# Patient Record
Sex: FEMALE | Race: BLACK OR AFRICAN AMERICAN
[De-identification: names, ages, dates, MRNs, and addresses within clinical notes are randomized per-mention and may not be internally consistent; named-entity substitution may affect disease eponyms.]

---

## 2017-12-19 ENCOUNTER — HOSPITAL ENCOUNTER (OUTPATIENT)
Dept: HOSPITAL 74 - JASUSAT | Age: 52
LOS: 1 days | Discharge: HOME | End: 2017-12-20
Attending: PLASTIC SURGERY
Payer: SELF-PAY

## 2017-12-19 VITALS — BODY MASS INDEX: 24.3 KG/M2

## 2017-12-19 DIAGNOSIS — M95.8: ICD-10-CM

## 2017-12-19 DIAGNOSIS — Z41.1: Primary | ICD-10-CM

## 2017-12-19 PROCEDURE — 0J080ZZ ALTERATION OF ABDOMEN SUBCUTANEOUS TISSUE AND FASCIA, OPEN APPROACH: ICD-10-PCS | Performed by: PLASTIC SURGERY

## 2017-12-19 RX ADMIN — CEFAZOLIN SODIUM SCH MLS/HR: 1 INJECTION, SOLUTION INTRAVENOUS at 15:00

## 2017-12-19 RX ADMIN — CEFAZOLIN SODIUM SCH MLS/HR: 1 INJECTION, SOLUTION INTRAVENOUS at 21:48

## 2017-12-19 RX ADMIN — HEPARIN SODIUM SCH UNIT: 5000 INJECTION, SOLUTION INTRAVENOUS; SUBCUTANEOUS at 21:48

## 2017-12-19 RX ADMIN — ACETAMINOPHEN PRN MG: 10 INJECTION, SOLUTION INTRAVENOUS at 14:50

## 2017-12-19 RX ADMIN — SODIUM CHLORIDE, POTASSIUM CHLORIDE, SODIUM LACTATE AND CALCIUM CHLORIDE SCH: 600; 310; 30; 20 INJECTION, SOLUTION INTRAVENOUS at 16:40

## 2017-12-19 NOTE — OP
Operative Note





- Note:


Operative Date: 12/19/17


Pre-Operative Diagnosis: abdominal deformity


Operation: abdominoplasty


Findings: 





diastasis recti


Post-Operative Diagnosis: Same as Pre-op


Surgeon: Goldberg,Neal D


Assistant: Karo Amador


Anesthesia: General


Drains & Tubes with Location: jpx 2

## 2017-12-20 VITALS — DIASTOLIC BLOOD PRESSURE: 72 MMHG | SYSTOLIC BLOOD PRESSURE: 148 MMHG | HEART RATE: 62 BPM

## 2017-12-20 VITALS — TEMPERATURE: 98.7 F

## 2017-12-20 RX ADMIN — CEFAZOLIN SODIUM SCH MLS/HR: 1 INJECTION, SOLUTION INTRAVENOUS at 02:28

## 2017-12-20 RX ADMIN — ACETAMINOPHEN PRN MG: 10 INJECTION, SOLUTION INTRAVENOUS at 05:13

## 2017-12-20 RX ADMIN — SODIUM CHLORIDE, POTASSIUM CHLORIDE, SODIUM LACTATE AND CALCIUM CHLORIDE SCH MLS/HR: 600; 310; 30; 20 INJECTION, SOLUTION INTRAVENOUS at 05:04

## 2017-12-20 RX ADMIN — CEFAZOLIN SODIUM SCH MLS/HR: 1 INJECTION, SOLUTION INTRAVENOUS at 09:40

## 2017-12-20 RX ADMIN — HEPARIN SODIUM SCH UNIT: 5000 INJECTION, SOLUTION INTRAVENOUS; SUBCUTANEOUS at 09:40

## 2017-12-20 RX ADMIN — ACETAMINOPHEN PRN MG: 10 INJECTION, SOLUTION INTRAVENOUS at 11:18

## 2017-12-20 RX ADMIN — CEFAZOLIN SODIUM SCH MLS/HR: 1 INJECTION, SOLUTION INTRAVENOUS at 14:20

## 2017-12-20 NOTE — PN
Progress Note, Physician


Chief Complaint: 





s/p abdominoplasty post op day one


History of Present Illness: 





under general anesthesia





- Current Medication List


Current Medications: 


Active Medications





Acetaminophen (Ofirmev Injection -)  1,000 mg IVPB Q6H PRN


   PRN Reason: FEVER OR PAIN


   Last Admin: 12/20/17 05:13 Dose:  1,000 mg


Fentanyl (Sublimaze Injection -)  50 mcg IVPUSH [id number] PRN


   PRN Reason: PAIN


   Last Admin: 12/19/17 14:45 Dose:  50 mcg


Heparin Sodium (Porcine) (Heparin -)  5,000 unit SQ BID NO


   Last Admin: 12/20/17 09:40 Dose:  5,000 unit


Hydromorphone HCl (Dilaudid Injection -)  1 mg IVPUSH [id number] PRN


   PRN Reason: PAIN


Lactated Ringer's (Lactated Ringers Solution)  1,000 mls @ 125 mls/hr IV ASDIR 

NO


   Last Admin: 12/19/17 20:14 Dose:  Not Given


Cefazolin Sodium (Ancef 1 Gm Premixed Ivpb -)  1 gm in 50 mls @ 100 mls/hr IVPB 

Q6H-IV NO


   Stop: 12/25/17 14:59


   Last Admin: 12/20/17 09:40 Dose:  100 mls/hr


Lactated Ringer's (Lactated Ringers Solution)  1,000 mls @ 75 mls/hr IV ASDIR 

NO


   Last Admin: 12/20/17 05:04 Dose:  75 mls/hr


Morphine Sulfate (Morphine Sulfate)  2 mg IVPUSH Q4H PRN


   PRN Reason: PAIN


   Last Admin: 12/20/17 03:42 Dose:  2 mg


Ondansetron HCl (Zofran Injection)  4 mg IVPUSH Q6H PRN


   PRN Reason: NAUSEA AND/OR VOMITING


Ondansetron HCl (Zofran Injection)  8 mg IVPB Q6H PRN


   PRN Reason: NAUSEA


Oxycodone HCl (Roxicodone -)  5 mg PO Q4H PRN


   PRN Reason: PAIN


   Last Admin: 12/20/17 01:43 Dose:  5 mg











- Objective


Vital Signs: 


 Vital Signs











Temperature  98.7 F   12/20/17 08:00


 


Pulse Rate  59 L  12/20/17 08:00


 


Respiratory Rate  18   12/20/17 08:00


 


Blood Pressure  133/66   12/20/17 08:00


 


O2 Sat by Pulse Oximetry (%)  99   12/20/17 09:00











Constitutional: Yes: Well Nourished


Cardiovascular: Yes: WNL


Respiratory: Yes: WNL


Gastrointestinal: Yes: WNL





Assessment/Plan





Pain controlled with IV analgesia, no nausea, vomiting or other adverse effects 

of anesthetic.  Dept of anesthesia will sign off care at this time.

## 2017-12-20 NOTE — PN
Progress Note (short form)





- Note


Progress Note: 





POD 1





VSS AF


All QUENTIN thin and functioning well


No collections


All tissues viable


Ambulating, Saroj PO, Using IS, will receive sq heparin


OK for discharge with instructions

## 2017-12-22 NOTE — PATH
Surgical Pathology Report



Patient Name:  DAVE TSE

Accession #:  [id number]

[facility]. Rec. #:  [id number]                                                        

   /Age/Gender:  1965 (Age: 52) / F

Account:  [id number]                                                          

             Location: AMBULATORY SURG

Taken:  2017

Received:  2017

Reported:  2017

Physicians:  Neal David Goldberg

  



Specimen(s) Received

 ABDOMINAL TISSUE 





Clinical History

Cosmetic case







Final Diagnosis

ABDOMINAL TISSUE, ABDOMINOPLASTY:

UNREMARKABLE SKIN AND ADIPOSE TISSUE. MACROSCOPIC DIAGNOSIS.





***Electronically Signed***

Maria Luisa Cordova M.D.





Gross Description

Received in formalin labeled "abdominal tissue" is a 288 g, 23.0 x 13.5 x 2.5 cm

aggregate of 2 brown, triangular, unoriented portions of skin with underlying

soft tissue. The epidermal surfaces are unremarkable. Sectioning reveals

unremarkable yellow, lobulated adipose tissue. No lesions are identified. No

sections are submitted, gross only.





[facility]

## 2017-12-31 ENCOUNTER — HOSPITAL ENCOUNTER (EMERGENCY)
Dept: HOSPITAL 74 - JER | Age: 52
LOS: 1 days | Discharge: HOME | End: 2018-01-01
Payer: COMMERCIAL

## 2017-12-31 VITALS — DIASTOLIC BLOOD PRESSURE: 64 MMHG | TEMPERATURE: 99.2 F | HEART RATE: 87 BPM | SYSTOLIC BLOOD PRESSURE: 126 MMHG

## 2017-12-31 VITALS — BODY MASS INDEX: 22.8 KG/M2

## 2017-12-31 DIAGNOSIS — Z98.890: ICD-10-CM

## 2017-12-31 DIAGNOSIS — K59.00: Primary | ICD-10-CM

## 2017-12-31 LAB
ALBUMIN SERPL-MCNC: 3.5 G/DL (ref 3.4–5)
ALP SERPL-CCNC: 72 U/L (ref 45–117)
ALT SERPL-CCNC: 21 U/L (ref 12–78)
ANION GAP SERPL CALC-SCNC: 11 MMOL/L (ref 8–16)
APPEARANCE UR: CLEAR
AST SERPL-CCNC: 11 U/L (ref 15–37)
BASOPHILS # BLD: 1.5 % (ref 0–2)
BILIRUB SERPL-MCNC: 0.9 MG/DL (ref 0.2–1)
BILIRUB UR STRIP.AUTO-MCNC: NEGATIVE MG/DL
BUN SERPL-MCNC: 9 MG/DL (ref 7–18)
CALCIUM SERPL-MCNC: 9.1 MG/DL (ref 8.5–10.1)
CHLORIDE SERPL-SCNC: 102 MMOL/L (ref 98–107)
CO2 SERPL-SCNC: 28 MMOL/L (ref 21–32)
COLOR UR: YELLOW
CREAT SERPL-MCNC: 0.7 MG/DL (ref 0.55–1.02)
DEPRECATED RDW RBC AUTO: 14.2 % (ref 11.6–15.6)
EOSINOPHIL # BLD: 0.7 % (ref 0–4.5)
GLUCOSE SERPL-MCNC: 89 MG/DL (ref 74–106)
HCT VFR BLD CALC: 38 % (ref 32.4–45.2)
HGB BLD-MCNC: 12.4 GM/DL (ref 10.7–15.3)
KETONES UR QL STRIP: NEGATIVE
LEUKOCYTE ESTERASE UR QL STRIP.AUTO: NEGATIVE
LIPASE SERPL-CCNC: 91 U/L (ref 73–393)
LYMPHOCYTES # BLD: 24.1 % (ref 8–40)
MCH RBC QN AUTO: 26.4 PG (ref 25.7–33.7)
MCHC RBC AUTO-ENTMCNC: 32.6 G/DL (ref 32–36)
MCV RBC: 80.9 FL (ref 80–96)
MONOCYTES # BLD AUTO: 6.7 % (ref 3.8–10.2)
NEUTROPHILS # BLD: 67 % (ref 42.8–82.8)
NITRITE UR QL STRIP: NEGATIVE
PH UR: 5 [PH] (ref 5–8)
PLATELET # BLD AUTO: 166 K/MM3 (ref 134–434)
PMV BLD: 9.5 FL (ref 7.5–11.1)
POTASSIUM SERPLBLD-SCNC: 3.5 MMOL/L (ref 3.5–5.1)
PROT SERPL-MCNC: 7.3 G/DL (ref 6.4–8.2)
PROT UR QL STRIP: NEGATIVE
PROT UR QL STRIP: NEGATIVE
RBC # BLD AUTO: 4.7 M/MM3 (ref 3.6–5.2)
RBC # UR STRIP: NEGATIVE /UL
SODIUM SERPL-SCNC: 141 MMOL/L (ref 136–145)
SP GR UR: 1.03 (ref 1–1.03)
UROBILINOGEN UR STRIP-MCNC: 2 MG/DL (ref 0.2–1)
WBC # BLD AUTO: 6.9 K/MM3 (ref 4–10)

## 2017-12-31 PROCEDURE — 3E033GC INTRODUCTION OF OTHER THERAPEUTIC SUBSTANCE INTO PERIPHERAL VEIN, PERCUTANEOUS APPROACH: ICD-10-PCS

## 2017-12-31 PROCEDURE — 3E0337Z INTRODUCTION OF ELECTROLYTIC AND WATER BALANCE SUBSTANCE INTO PERIPHERAL VEIN, PERCUTANEOUS APPROACH: ICD-10-PCS

## 2017-12-31 NOTE — PDOC
Attending Attestation





- Resident


Resident Name: Ryan Whitlock





- ED Attending Attestation


I have performed the following: I have examined & evaluated the patient, The 

case was reviewed & discussed with the resident, I agree w/resident's findings 

& plan, Exceptions are as noted





- HPI


HPI: 





12/31/17 17:46


53 yo female s/p  abdominalplasty about 10 days ago by Dr Goldberg now p/w abd 

pain  and no bowel mvmt for past week. 





- Physicial Exam


PE: 





12/31/17 17:49


slender 53 yo female p/w diffuse abdominal pain


head- ncat


neck-supple,bruits


lungs -cta b/l


cvs -[id number] no murmers


abd lower abdominal  tenderness to deep palpation,no rebound


extremities- no cellulitis, no pitting edema,motor strength 5/5


neuro- axox3,ambulatory,no gross focal neuro deficits


skin  survival site is intact with NO evidence of wound dehiscent or purulence 


psych -appropriate








- Medical Decision Making





01/01/18 01:55


ct scan of abd/pelvis with contrast was NEGATIVE for any acute abd/pelvic  

process but was c/w constipation


pt given laxatives


discharged home with sister

## 2017-12-31 NOTE — PDOC
*Physical Exam





- Vital Signs


 Last Vital Signs











Temp Pulse Resp BP Pulse Ox


 


 99.2 F   87   20   126/64   100 


 


 12/31/17 15:42  12/31/17 15:42  12/31/17 15:42  12/31/17 15:42  12/31/17 15:42














- Physical Exam


Comments: 





12/31/17 21:08


GENERAL: Awake, alert, and fully oriented, in no acute distress


HEAD: No signs of trauma, normocephalic, atraumatic


EYES: PERRLA, EOMI, sclera anicteric, conjunctiva clear


ENT: Auricles normal inspection, hearing grossly normal, nares patent, 

oropharynx clear without


exudates. Moist mucosa


LUNGS: No distress, speaks full sentences, clear to auscultation bilaterally


HEART: Regular rate and rhythm, normal S1 and S2, no murmurs, rubs or gallops, 

peripheral pulses normal and equal bilaterally.


ABDOMEN: Drain present, no signs of infection, diffusely tender.


NEUROLOGICAL: Cranial nerves II through XII grossly intact.  Normal speech,  no 

focal sensorimotor deficits














<Alireza Hood - Last Filed: 12/31/17 22:50>





- Vital Signs


 Last Vital Signs











Temp Pulse Resp BP Pulse Ox


 


 99.2 F   87   20   126/64   100 


 


 12/31/17 15:42  12/31/17 15:42  12/31/17 15:42  12/31/17 15:42  12/31/17 15:42














<Alma Lay - Last Filed: 01/01/18 01:00>





ED Treatment Course





- LABORATORY


CBC & Chemistry Diagram: 


 12/31/17 16:55





 12/31/17 16:55





- ADDITIONAL ORDERS


Additional order review: 


 Laboratory  Results











  12/31/17 12/31/17 12/31/17





  17:05 16:55 16:55


 


Sodium    141


 


Potassium    3.5


 


Chloride    102


 


Carbon Dioxide    28


 


Anion Gap    11


 


BUN    9


 


Creatinine    0.7


 


Creat Clearance w eGFR    > 60


 


Random Glucose    89


 


Lactic Acid   1.3 


 


Calcium    9.1


 


Total Bilirubin    0.9


 


AST    11 L


 


ALT    21


 


Alkaline Phosphatase    72


 


Total Protein    7.3


 


Albumin    3.5


 


Lipase    91


 


Urine Color  Yellow  


 


Urine Appearance  Clear  


 


Urine pH  5.0  


 


Ur Specific Gravity  1.026  


 


Urine Protein  Negative  


 


Urine Glucose (UA)  Negative  


 


Urine Ketones  Negative  


 


Urine Blood  Negative  


 


Urine Nitrite  Negative  


 


Urine Bilirubin  Negative  


 


Urine Urobilinogen  2.0 H  








 











  12/31/17





  16:55


 


RBC  4.70


 


MCV  80.9


 


MCHC  32.6


 


RDW  14.2


 


MPV  9.5


 


Neutrophils %  67.0


 


Lymphocytes %  24.1


 


Monocytes %  6.7


 


Eosinophils %  0.7


 


Basophils %  1.5














- Medications


Given in the ED: 


ED Medications














Discontinued Medications














Generic Name Dose Route Start Last Admin





  Trade Name Freq  PRN Reason Stop Dose Admin


 


Sodium Chloride  1,000 mls @ 1,000 mls/hr  12/31/17 16:28  12/31/17 17:11





  Normal Saline -  IV  12/31/17 17:27  1,000 mls/hr





  ASDIR STA   Administration


 


Ondansetron HCl  4 mg  12/31/17 16:28  12/31/17 17:12





  Zofran Injection  IVPUSH  12/31/17 16:29  4 mg





  ONCE ONE   Administration














<Alireza Hood - Last Filed: 12/31/17 22:50>





- LABORATORY


CBC & Chemistry Diagram: 


 12/31/17 16:55





 12/31/17 16:55





- ADDITIONAL ORDERS


Additional order review: 


 Laboratory  Results











  12/31/17 12/31/17 12/31/17





  17:05 16:55 16:55


 


Sodium    141


 


Potassium    3.5


 


Chloride    102


 


Carbon Dioxide    28


 


Anion Gap    11


 


BUN    9


 


Creatinine    0.7


 


Creat Clearance w eGFR    > 60


 


Random Glucose    89


 


Lactic Acid   1.3 


 


Calcium    9.1


 


Total Bilirubin    0.9


 


AST    11 L


 


ALT    21


 


Alkaline Phosphatase    72


 


Total Protein    7.3


 


Albumin    3.5


 


Lipase    91


 


Urine Color  Yellow  


 


Urine Appearance  Clear  


 


Urine pH  5.0  


 


Ur Specific Gravity  1.026  


 


Urine Protein  Negative  


 


Urine Glucose (UA)  Negative  


 


Urine Ketones  Negative  


 


Urine Blood  Negative  


 


Urine Nitrite  Negative  


 


Urine Bilirubin  Negative  


 


Urine Urobilinogen  2.0 H  


 


Ur Leukocyte Esterase  Negative  








 











  12/31/17





  16:55


 


RBC  4.70


 


MCV  80.9


 


MCHC  32.6


 


RDW  14.2


 


MPV  9.5


 


Neutrophils %  67.0


 


Lymphocytes %  24.1


 


Monocytes %  6.7


 


Eosinophils %  0.7


 


Basophils %  1.5














- Medications


Given in the ED: 


ED Medications














Discontinued Medications














Generic Name Dose Route Start Last Admin





  Trade Name Freq  PRN Reason Stop Dose Admin


 


Sodium Chloride  1,000 mls @ 1,000 mls/hr  12/31/17 16:28  12/31/17 17:11





  Normal Saline -  IV  12/31/17 17:27  1,000 mls/hr





  ASDIR STA   Administration


 


Lactulose  20 gm  12/31/17 22:12  12/31/17 22:45





  Cephulac (Oral Use)  PO  12/31/17 22:13  20 gm





  ONCE ONE   Administration


 


Ondansetron HCl  4 mg  12/31/17 16:28  12/31/17 17:12





  Zofran Injection  IVPUSH  12/31/17 16:29  4 mg





  ONCE ONE   Administration














<Alma Lay - Last Filed: 01/01/18 01:00>





Medical Decision Making





- Medical Decision Making





12/31/17 21:09


Assumed care from Dr Whitlock. 52F with history of GERD here today complaining 

of abdominal pain and constipation after tummy tuck 2 weeks ago. On percocet 

for pain. Patient on eliquis for post-op prophylaxis. CT pending. Labs normal.





CT shows large stool burden without evidence of obstruction or infection. Will 

give enema. Patient moved into room with private bathroom. Will administer and 

reassess. 


12/31/17 22:50


Enema produced small bowel movement. Will give lactulose and observe until 

patient can get a ride home at 1am. Will discharge with return precautions and 

instructions to follow up with her surgeon.





<Alireza Hood - Last Filed: 12/31/17 22:50>





*DC/Admit/Observation/Transfer





<Alireza Hood - Last Filed: 12/31/17 22:50>





<Alma Lay - Last Filed: 01/01/18 01:00>


Diagnosis at time of Disposition: 


Constipation


Qualifiers:


 Constipation type: other constipation type Qualified Code(s): K59.09 - Other 

constipation








- Discharge Dispostion


Disposition: HOME


Condition at time of disposition: Stable





- Referrals


Referrals: 


Yasemin Dennison [Primary Care Provider] - 





- Patient Instructions


Printed Discharge Instructions:  DI for Constipation


Additional Instructions: 


please followup with your surgeon this week





- Post Discharge Activity

## 2017-12-31 NOTE — PDOC
History of Present Illness





- History of Present Illness


Initial Comments: 





12/31/17 16:38


The patient is a 52 year old female with a history of GERD who presents for 

evaluation of abdominal pain and constipation.  The patient reports a 2 week 

history of crampy abdominal pain since a abdominoplasty surgery she had on 12/ 19.  She reports continued constipation since the surgery despite the use of 

colace and laxitives.  She states that she presented to her surgeon's office 1 

week ago for these complaints and was prescribed a laxitive without any 

success.  She reports that she attempted an enema 1 day ago at the advice of 

her surgeon without success as well prompting her presentation to the ED today.

  She denies any vomiting and has been tolerating PO intake.  She denies any 

fevers, chills, SOB, chest pain, or changes with urination.





<Ryan Whitlock - Last Filed: 12/31/17 16:38>





<Alma Lay - Last Filed: 01/01/18 01:55>





- General


Chief Complaint: Pain


Stated Complaint: REVISIT/ ABD PAIN, CONSTIPATED


Time Seen by Provider: 12/31/17 16:08





Past History





- Past Medical History


Anemia: No


Asthma: No


Cancer: No


Cardiac Disorders: No


CVA: No


COPD: No


CHF: No


DVT: No


Dementia: No


Diabetes: No


GI Disorders: Yes (REFLUX)


 Disorders: No


HTN: No


Hypercholesterolemia: No


Liver Disease: No


Seizures: No


Thyroid Disease: No





- Surgical History


Abdominal Surgery: Yes (UMBILICAL HERNIA REPAIR AT AGE 8)


Appendectomy: Yes


Cardiac Surgery: No


Cholecystectomy: No


Lung Surgery: No


Neurologic Surgery: No


Orthopedic Surgery: No





- Suicide/Smoking/Psychosocial Hx


Smoking History: Never smoked


Have you smoked in the past 12 months: Yes


Number of Cigarettes Smoked Daily: 5


'Breaking Loose' booklet given: 12/19/17


Hx Alcohol Use: No


Drug/Substance Use Hx: No


Substance Use Type: None


Hx Substance Use Treatment: No





<Ryan Whitlock - Last Filed: 12/31/17 16:38>





<Alma Lay - Last Filed: 01/01/18 01:55>





- Past Medical History


Allergies/Adverse Reactions: 


 Allergies











Allergy/AdvReac Type Severity Reaction Status Date / Time


 


metoclopramide HCl Allergy Intermediate SLOW HEART Verified 12/31/17 15:46





[From Reglan]   RATE  











Home Medications: 


Ambulatory Orders





Apixaban [Eliquis] 2.5 mg PO TID 12/31/17 


Bisacodyl [Dulcolax] 5 mg PO TID 12/31/17 


Cyclobenzaprine HCl [Flexeril 10 mg] 10 mg PO BID PRN 12/31/17 


Polyethylene Glycol 3350 [Gavilax] 17 gm PO DAILY 12/31/17 











**Review of Systems





- Review of Systems


Comments:: 





12/31/17 16:50


Constitutional: No fevers, chills, fatigue, malaise


HEENT: No Rhinorrhea, nasal congestion, visual changes


Cardiovascular: No chest pain, syncope, palpitations, lightheadedness


Respiratory: No Cough, SOB, Hemoptysis,


Gastrointestinal: Abdominal pain, nausea, diarrhea.  No Vomiting, Constipation, 

Melena


Genitourinary: No Dysuria, Frequency, Urgency, Hesitancy, Hematuria, Flank pain


Musculoskeletal: No Myalgia, arthralgia


Skin: No rashes, bruising, pallor


Neurologic: No Headache, Dizziness, Numbness, Weakness, or Tingling


Psychiatric: No Hallucinations. No SI or HI








<Ryan Whitlock - Last Filed: 12/31/17 16:38>





*Physical Exam





- Vital Signs


 Last Vital Signs











Temp Pulse Resp BP Pulse Ox


 


 99.2 F   87   20   126/64   100 


 


 12/31/17 15:42  12/31/17 15:42  12/31/17 15:42  12/31/17 15:42  12/31/17 15:42














- Physical Exam


Comments: 





12/31/17 16:51


General Appearance: Nourished. No Apparent Distress


HEENT: EOMI, EDENILSON. No Pharyngeal Erythema, Tonsillar Exudate, Tonsillar Erythema


Neck: No Cervical Lymphadenopathy


Respiratory/Chest: Lungs Clear, Normal Breath Sounds. No Crackles, Rales, 

Rhonchi, Wheezing


Cardiovascular: Regular Rhythm, Regular Rate. No Murmur, Gallops, Rubs


Gastrointestinal/Abdominal: Hypoactive bowel sounds. Well healing abdominal 

incision with a drain in place without any erythema or drainage.  Diffuse lower 

abdominal tenderness to palpation with some guarding.  No Rebound,


Musculoskeletal: No CVA Tenderness


Extremity: Normal Capillary Refill


Integumentary: Normal Color, Dry, Warm


Neurologic:  Fully Oriented, Alert, Normal Mood/Affect, Normal Response, 





<Ryan Whitlock - Last Filed: 12/31/17 16:38>





- Vital Signs


 Last Vital Signs











Temp Pulse Resp BP Pulse Ox


 


 99.2 F   87   20   126/64   100 


 


 12/31/17 15:42  12/31/17 15:42  12/31/17 15:42  12/31/17 15:42  12/31/17 15:42














<Alma Lay - Last Filed: 01/01/18 01:55>





ED Treatment Course





- RADIOLOGY


Radiology Studies Ordered: 














 Category Date Time Status


 


 ABDOMEN & PELVIS CT WITH CONTR [CT] Stat CT Scan  12/31/17 16:32 Ordered














<Ryan Whitlock - Last Filed: 12/31/17 16:38>





- LABORATORY


CBC & Chemistry Diagram: 


 12/31/17 16:55





 12/31/17 16:55





- ADDITIONAL ORDERS


Additional order review: 


 Laboratory  Results











  12/31/17 12/31/17 12/31/17





  17:05 16:55 16:55


 


Sodium    141


 


Potassium    3.5


 


Chloride    102


 


Carbon Dioxide    28


 


Anion Gap    11


 


BUN    9


 


Creatinine    0.7


 


Creat Clearance w eGFR    > 60


 


Random Glucose    89


 


Lactic Acid   1.3 


 


Calcium    9.1


 


Total Bilirubin    0.9


 


AST    11 L


 


ALT    21


 


Alkaline Phosphatase    72


 


Total Protein    7.3


 


Albumin    3.5


 


Lipase    91


 


Urine Color  Yellow  


 


Urine Appearance  Clear  


 


Urine pH  5.0  


 


Ur Specific Gravity  1.026  


 


Urine Protein  Negative  


 


Urine Glucose (UA)  Negative  


 


Urine Ketones  Negative  


 


Urine Blood  Negative  


 


Urine Nitrite  Negative  


 


Urine Bilirubin  Negative  


 


Urine Urobilinogen  2.0 H  








 











  12/31/17





  16:55


 


RBC  4.70


 


MCV  80.9


 


MCHC  32.6


 


RDW  14.2


 


MPV  9.5


 


Neutrophils %  67.0


 


Lymphocytes %  24.1


 


Monocytes %  6.7


 


Eosinophils %  0.7


 


Basophils %  1.5














- Medications


Given in the ED: 


ED Medications














Discontinued Medications














Generic Name Dose Route Start Last Admin





  Trade Name Freq  PRN Reason Stop Dose Admin


 


Sodium Chloride  1,000 mls @ 1,000 mls/hr  12/31/17 16:28  12/31/17 17:11





  Normal Saline -  IV  12/31/17 17:27  1,000 mls/hr





  ASDIR STA   Administration


 


Ondansetron HCl  4 mg  12/31/17 16:28  12/31/17 17:12





  Zofran Injection  IVPUSH  12/31/17 16:29  4 mg





  ONCE ONE   Administration














<Alma Layh - Last Filed: 01/01/18 01:55>





Medical Decision Making





- Medical Decision Making





12/31/17 16:53


The patient is a 52 year old female with a history of GERD who presents for 

evaluation of abdominal pain and constipation.  Differential includes but is 

not limited to:  Illeus, abcess, obstruction, infectious, metabolic 

derangement.  Given the patient's tender abdomen, we will obtain a ct abdomen 

pelvis to evaluate for any abscess or obstruction.  We have a lower suspicion 

for obstruction, given the patient's ability to tolerate PO and absence of 

vomiting.  It is likely her symptoms are due to constipation.  We will also 

obtain a cbc, cmp, lipase, lactate, ua to evaluate for other etiologies.  We 

will treat with zofran and some iv fluids.  We will continue to monitor and 

reassess.





<Ryan Whitlock - Last Filed: 12/31/17 16:38>





*DC/Admit/Observation/Transfer





<Ryan Whitlock - Last Filed: 12/31/17 16:38>





<Alma Lay - Last Filed: 01/01/18 01:55>


Diagnosis at time of Disposition: 


 Constipation








- Discharge Dispostion


Disposition: HOME


Condition at time of disposition: Stable





- Referrals


Referrals: 


Yasemin Dennison [Primary Care Provider] - 





- Patient Instructions


Printed Discharge Instructions:  DI for Constipation


Additional Instructions: 


please followup with your surgeon this week





- Post Discharge Activity

## 2023-04-06 PROBLEM — Z00.00 ENCOUNTER FOR PREVENTIVE HEALTH EXAMINATION: Status: ACTIVE | Noted: 2023-04-06

## 2023-04-25 ENCOUNTER — APPOINTMENT (OUTPATIENT)
Dept: NEUROSURGERY | Facility: CLINIC | Age: 58
End: 2023-04-25

## 2023-05-24 PROBLEM — Z00.00 ENCOUNTER FOR PREVENTIVE HEALTH EXAMINATION: Status: ACTIVE | Noted: 2023-05-24

## 2023-05-30 ENCOUNTER — APPOINTMENT (OUTPATIENT)
Dept: NEUROSURGERY | Facility: CLINIC | Age: 58
End: 2023-05-30
Payer: OTHER MISCELLANEOUS

## 2023-05-30 ENCOUNTER — TRANSCRIPTION ENCOUNTER (OUTPATIENT)
Age: 58
End: 2023-05-30

## 2023-05-30 VITALS
BODY MASS INDEX: 24.25 KG/M2 | HEART RATE: 81 BPM | RESPIRATION RATE: 18 BRPM | DIASTOLIC BLOOD PRESSURE: 64 MMHG | HEIGHT: 68 IN | SYSTOLIC BLOOD PRESSURE: 130 MMHG | WEIGHT: 160 LBS | OXYGEN SATURATION: 98 %

## 2023-05-30 DIAGNOSIS — Z82.49 FAMILY HISTORY OF ISCHEMIC HEART DISEASE AND OTHER DISEASES OF THE CIRCULATORY SYSTEM: ICD-10-CM

## 2023-05-30 DIAGNOSIS — Z82.3 FAMILY HISTORY OF STROKE: ICD-10-CM

## 2023-05-30 DIAGNOSIS — Z87.09 PERSONAL HISTORY OF OTHER DISEASES OF THE RESPIRATORY SYSTEM: ICD-10-CM

## 2023-05-30 DIAGNOSIS — G43.909 MIGRAINE, UNSPECIFIED, NOT INTRACTABLE, W/OUT STATUS MIGRAINOSUS: ICD-10-CM

## 2023-05-30 DIAGNOSIS — Z86.79 PERSONAL HISTORY OF OTHER DISEASES OF THE CIRCULATORY SYSTEM: ICD-10-CM

## 2023-05-30 DIAGNOSIS — Z86.59 PERSONAL HISTORY OF OTHER MENTAL AND BEHAVIORAL DISORDERS: ICD-10-CM

## 2023-05-30 DIAGNOSIS — Z63.5 DISRUPTION OF FAMILY BY SEPARATION AND DIVORCE: ICD-10-CM

## 2023-05-30 DIAGNOSIS — Z72.0 TOBACCO USE: ICD-10-CM

## 2023-05-30 DIAGNOSIS — Z80.9 FAMILY HISTORY OF MALIGNANT NEOPLASM, UNSPECIFIED: ICD-10-CM

## 2023-05-30 PROCEDURE — 99204 OFFICE O/P NEW MOD 45 MIN: CPT

## 2023-05-30 RX ORDER — ESCITALOPRAM OXALATE 20 MG/1
20 TABLET ORAL
Qty: 30 | Refills: 0 | Status: ACTIVE | COMMUNITY
Start: 2022-08-12

## 2023-05-30 RX ORDER — BUPROPION HYDROCHLORIDE 300 MG/1
300 TABLET, EXTENDED RELEASE ORAL
Qty: 30 | Refills: 0 | Status: ACTIVE | COMMUNITY
Start: 2022-12-12

## 2023-05-30 RX ORDER — GABAPENTIN 300 MG/1
300 CAPSULE ORAL
Qty: 30 | Refills: 3 | Status: ACTIVE | COMMUNITY
Start: 2023-05-30 | End: 1900-01-01

## 2023-05-30 RX ORDER — FAMOTIDINE 20 MG/1
20 TABLET, FILM COATED ORAL
Qty: 14 | Refills: 0 | Status: ACTIVE | COMMUNITY
Start: 2023-03-14

## 2023-05-30 RX ORDER — MECLIZINE HYDROCHLORIDE 12.5 MG/1
12.5 TABLET ORAL
Qty: 30 | Refills: 0 | Status: ACTIVE | COMMUNITY
Start: 2023-03-14

## 2023-05-30 RX ORDER — TRAZODONE HYDROCHLORIDE 100 MG/1
100 TABLET ORAL
Qty: 30 | Refills: 0 | Status: ACTIVE | COMMUNITY
Start: 2023-01-13

## 2023-05-30 RX ORDER — ROSUVASTATIN CALCIUM 5 MG/1
5 TABLET, FILM COATED ORAL
Qty: 30 | Refills: 0 | Status: ACTIVE | COMMUNITY
Start: 2023-04-14

## 2023-05-30 RX ORDER — CYCLOBENZAPRINE HYDROCHLORIDE 10 MG/1
10 TABLET, FILM COATED ORAL
Qty: 20 | Refills: 0 | Status: ACTIVE | COMMUNITY
Start: 2023-04-27

## 2023-05-30 RX ORDER — ESCITALOPRAM OXALATE 10 MG/1
10 TABLET ORAL
Qty: 90 | Refills: 0 | Status: ACTIVE | COMMUNITY
Start: 2023-01-13

## 2023-05-30 RX ORDER — ERGOCALCIFEROL 1.25 MG/1
1.25 MG CAPSULE, LIQUID FILLED ORAL
Qty: 4 | Refills: 0 | Status: ACTIVE | COMMUNITY
Start: 2023-03-14

## 2023-05-30 RX ORDER — ALBUTEROL SULFATE 90 UG/1
108 (90 BASE) INHALANT RESPIRATORY (INHALATION)
Qty: 7 | Refills: 0 | Status: ACTIVE | COMMUNITY
Start: 2022-08-19

## 2023-05-30 RX ORDER — CLONAZEPAM 0.5 MG/1
0.5 TABLET ORAL
Qty: 20 | Refills: 0 | Status: ACTIVE | COMMUNITY
Start: 2023-01-13

## 2023-05-30 SDOH — SOCIAL STABILITY - SOCIAL INSECURITY: DISRUPTION OF FAMILY BY SEPARATION AND DIVORCE: Z63.5

## 2023-06-28 ENCOUNTER — APPOINTMENT (OUTPATIENT)
Dept: PHYSICAL MEDICINE AND REHAB | Facility: CLINIC | Age: 58
End: 2023-06-28
Payer: OTHER MISCELLANEOUS

## 2023-06-28 VITALS
WEIGHT: 165 LBS | HEART RATE: 81 BPM | DIASTOLIC BLOOD PRESSURE: 74 MMHG | HEIGHT: 68 IN | RESPIRATION RATE: 18 BRPM | BODY MASS INDEX: 25.01 KG/M2 | SYSTOLIC BLOOD PRESSURE: 127 MMHG

## 2023-06-28 PROCEDURE — 99205 OFFICE O/P NEW HI 60 MIN: CPT

## 2023-07-19 ENCOUNTER — APPOINTMENT (OUTPATIENT)
Dept: NEUROSURGERY | Facility: CLINIC | Age: 58
End: 2023-07-19

## 2023-08-22 ENCOUNTER — APPOINTMENT (OUTPATIENT)
Dept: PHYSICAL MEDICINE AND REHAB | Facility: CLINIC | Age: 58
End: 2023-08-22
Payer: OTHER MISCELLANEOUS
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
## 2023-08-22 DIAGNOSIS — G95.19 OTHER VASCULAR MYELOPATHIES: ICD-10-CM

## 2023-08-22 DIAGNOSIS — M54.16 RADICULOPATHY, LUMBAR REGION: ICD-10-CM

## 2023-08-22 DIAGNOSIS — G89.29 RADICULOPATHY, LUMBAR REGION: ICD-10-CM

## 2023-08-22 PROCEDURE — 95886 MUSC TEST DONE W/N TEST COMP: CPT

## 2023-08-22 PROCEDURE — 95911 NRV CNDJ TEST 9-10 STUDIES: CPT

## 2023-08-22 NOTE — PROCEDURE
[de-identified] : EMG /NERVE CONDUCTION STUDY performed today without complication  Tabulated data, wave forms , conclusions and recommendations are attached and in the procedure report.  Please refer to the scanned study attached to this encounter  Full history and focused clinical exam performed prior to the examination and documented in report

## 2023-10-03 ENCOUNTER — APPOINTMENT (OUTPATIENT)
Dept: NEUROSURGERY | Facility: CLINIC | Age: 58
End: 2023-10-03
Payer: OTHER MISCELLANEOUS

## 2023-10-03 VITALS
WEIGHT: 165 LBS | HEART RATE: 81 BPM | BODY MASS INDEX: 25.01 KG/M2 | HEIGHT: 68 IN | DIASTOLIC BLOOD PRESSURE: 74 MMHG | RESPIRATION RATE: 18 BRPM | SYSTOLIC BLOOD PRESSURE: 127 MMHG | OXYGEN SATURATION: 98 %

## 2023-10-03 DIAGNOSIS — R20.2 ANESTHESIA OF SKIN: ICD-10-CM

## 2023-10-03 DIAGNOSIS — R20.0 ANESTHESIA OF SKIN: ICD-10-CM

## 2023-10-03 DIAGNOSIS — G89.29 DORSALGIA, UNSPECIFIED: ICD-10-CM

## 2023-10-03 DIAGNOSIS — M54.9 DORSALGIA, UNSPECIFIED: ICD-10-CM

## 2023-10-03 PROCEDURE — 99213 OFFICE O/P EST LOW 20 MIN: CPT
